# Patient Record
Sex: MALE | Race: WHITE
[De-identification: names, ages, dates, MRNs, and addresses within clinical notes are randomized per-mention and may not be internally consistent; named-entity substitution may affect disease eponyms.]

---

## 2020-01-28 ENCOUNTER — APPOINTMENT (OUTPATIENT)
Dept: SURGERY | Facility: CLINIC | Age: 55
End: 2020-01-28
Payer: COMMERCIAL

## 2020-01-28 VITALS
SYSTOLIC BLOOD PRESSURE: 139 MMHG | TEMPERATURE: 97.5 F | HEIGHT: 66.5 IN | OXYGEN SATURATION: 97 % | DIASTOLIC BLOOD PRESSURE: 74 MMHG | WEIGHT: 259.13 LBS | HEART RATE: 90 BPM | BODY MASS INDEX: 41.15 KG/M2

## 2020-01-28 DIAGNOSIS — E66.01 MORBID (SEVERE) OBESITY DUE TO EXCESS CALORIES: ICD-10-CM

## 2020-01-28 DIAGNOSIS — Z00.00 ENCOUNTER FOR GENERAL ADULT MEDICAL EXAMINATION W/OUT ABNORMAL FINDINGS: ICD-10-CM

## 2020-01-28 DIAGNOSIS — Z21 ASYMPTOMATIC HUMAN IMMUNODEFICIENCY VIRUS [HIV] INFECTION STATUS: ICD-10-CM

## 2020-01-28 DIAGNOSIS — K75.9 INFLAMMATORY LIVER DISEASE, UNSPECIFIED: ICD-10-CM

## 2020-01-28 PROCEDURE — 99204 OFFICE O/P NEW MOD 45 MIN: CPT

## 2020-01-28 NOTE — HISTORY OF PRESENT ILLNESS
[de-identified] : 53 y/o with PMHx of morbid obesity (BMI 41), HIV, Hepatitis B (on Truvata), liver problems and PSHx of gastric balloon operation in [facility] 2019, R foot operation and cosmetic face lift presents today for initial bariatric consult. Patient reports that he is currently at his heaviest weight: 259 lb. He states that he was ~233 lbs prior to his gastric balloon and his lowest weight after the operation was ~180 lb. He reports that he was supposed to keep the gastric balloon in for 6 months but had to take it out after ~5 months on 4/1/19 because he was sick. Patient has tried several diet and exercise regimens in the past with limited success and eventual weight regain. He states that he does not exercise now. Patient works as an international finance . He is  and has an adopted son. He denies smoking, drinking, drug use. Patient's PCP also manages his HIV medications. Mr. Nix will need a sleep study as part of his bariatric work up in order to rule out any sleep disorders associated with morbid obesity.

## 2020-01-28 NOTE — ASSESSMENT
[FreeTextEntry1] : 55 y/o with PMHx of morbid obesity (BMI 41), HIV, Hepatitis B (on Truvata), liver problems PSHx of gastric balloon operation in [facility], [place] 2019, R foot operation and cosmetic face lift presents today for initial bariatric consult. Discussed with the patient on available options in bariatric surgery including VSG vs gastric bypass vs. DS. Risks including possible MI, DVT, bleeding, Pulmonary Embolism, as well as benefits and the need for post-operative care and required dietary adjustment to the proposed procedures were discussed with the patient and all questions were answered to their satisfaction. Work up process and need for monthly weigh ins was also explained to the patient. Patient understands and will decide on a procedure after completion and review of work up. Will begin work up process, including a Sleep study to assess for any sleep disorders associated with morbid obesity. We discussed how bariatric surgery can affect absorption of medication so this will have to be considered with patient and his PCP when choosing a bariatric operation because he is on HIV medication. Patient was most interested in the DS operation but I informed him that this operation has the biggest effect on absorption. Will follow up here as needed for help in completing work up as well as to discuss the results and to determine a plan of care.

## 2020-01-28 NOTE — PLAN
[FreeTextEntry1] : Will begin bariatric work up including sleep study to rule out any sleep disorders associated with morbid obesity.

## 2020-01-28 NOTE — ADDENDUM
[FreeTextEntry1] : This note was written by Laura Wang on 01/28/2020 acting as scribe for Dr. Nathan

## 2020-01-28 NOTE — END OF VISIT
[FreeTextEntry3] : All medical record entries made by the Scribe were at my, Dr. Nathan's, discretion and personally dictated by me on 01/28/2020. I have reviewed the chart and agree that the record accurately reflects my personal performance of the history, physical exam, assessment and plan. I have also personally directed, reviewed and agreed to the chart.

## 2020-02-25 ENCOUNTER — APPOINTMENT (OUTPATIENT)
Dept: BARIATRICS | Facility: CLINIC | Age: 55
End: 2020-02-25

## 2020-03-19 ENCOUNTER — APPOINTMENT (OUTPATIENT)
Dept: BARIATRICS | Facility: CLINIC | Age: 55
End: 2020-03-19

## 2021-03-11 PROBLEM — K75.9 HEPATITIS: Status: ACTIVE | Noted: 2020-01-28
